# Patient Record
Sex: MALE | URBAN - METROPOLITAN AREA
[De-identification: names, ages, dates, MRNs, and addresses within clinical notes are randomized per-mention and may not be internally consistent; named-entity substitution may affect disease eponyms.]

---

## 2017-12-28 NOTE — PATIENT DISCUSSION
*CATARACTS, OD -  VISUALLY SIGNIFICANT AND BOTHERSOME TO PATIENT AT THIS TIME. PATIENT DESIRES TO WAIT ON SUGRERY. RECOMMEND UPDATED GLASSES RX.

## 2017-12-28 NOTE — PATIENT DISCUSSION
MODERATE KERATOCONJUCTIVITIS WITH DRY EYE, OU: RECOMMEND REFRESH CELLUVISC BID AND QHS OU AND THE DAILY INTAKE OF OMEGA-3 DHA/EPA FATTY ACIDS TO HELP RELIEVE SYMPTOMS WITH PRIMARY CARE PHYSICIANS APPROVAL. Lenore Holland CONTINUE XIIDRA  BID OU. WILL CONSIDER PUNCTAL PLUGS NEXT VISIT IF NOT RESPONSIVE OR IF SYMPTOMS PERSIST. RETURN FOR FOLLOW-UP AS SCHEDULED OR SOONER IF SYMPTOMS WORSEN.

## 2018-06-13 NOTE — PATIENT DISCUSSION
Continue: Refresh Celluvisc (carboxymethylcellulose sodium): dropperette,gel: 1% 1 drop four times a day as directed into affected eye

## 2018-06-13 NOTE — PATIENT DISCUSSION
POSTERIOR VITREOUS DETACHMENT WITH VITREOUS FLOATERS, OS: NO HOLES OR NO TEARS 360' WITH INDIRECT EXAM, OU. F&amp;F PRECAUTIONS REVIEWED WITH THE PATIENT. RETURN  AS SCHEDULED.

## 2020-06-08 NOTE — PATIENT DISCUSSION
New Prescription: Gerry 128 (sodium chloride): ointment: 5% 1 a thin layer at bedtime as directed into left eye 06-

## 2020-06-08 NOTE — PATIENT DISCUSSION
*MILD TO MODERATE DRY EYE, OU: CONTINUE TO USE REFRESH OD. PRESCRIBED CELLUVISC TID OS. CONSIDER JEMAL 128 IF NO RELIEF IN OS AFTER 1 WEEK. RECOMMEND TAKING OMEGA-3 FISH OIL. RETURN FOR FOLLOW-UP AS SCHEDULED OR SOONER IF SYMPTOMS WORSEN.

## 2020-06-08 NOTE — PATIENT DISCUSSION
Epiretinal Membrane Counseling: The diagnosis and natural history of epiretinal membrane was discussed with the patient including the risk of progression with retinal traction resulting in visual distortion. Patient instructed on how to do [address] to check for distortion in vision, The patient is instructed to call back immediately if any vision changes, and keep follow up as scheduled.

## 2020-12-21 ENCOUNTER — IMPORTED ENCOUNTER (OUTPATIENT)
Dept: URBAN - METROPOLITAN AREA CLINIC 9 | Facility: CLINIC | Age: 40
End: 2020-12-21

## 2021-10-16 ASSESSMENT — VISUAL ACUITY
OS_CC: 20/20 SN
OD_CC: 20/20 SN
OD_SC: 20/30 SN
OS_SC: 20/25 SN

## 2021-10-16 ASSESSMENT — KERATOMETRY
OD_K1POWER_DIOPTERS: 42.5
OS_AXISANGLE2_DEGREES: 90
OD_AXISANGLE2_DEGREES: 23
OS_K2POWER_DIOPTERS: 42.75
OD_K2POWER_DIOPTERS: 43
OS_AXISANGLE_DEGREES: 180
OD_AXISANGLE_DEGREES: 113
OS_K1POWER_DIOPTERS: 42.75